# Patient Record
Sex: MALE | Race: ASIAN | Employment: FULL TIME | URBAN - METROPOLITAN AREA
[De-identification: names, ages, dates, MRNs, and addresses within clinical notes are randomized per-mention and may not be internally consistent; named-entity substitution may affect disease eponyms.]

---

## 2021-04-01 ENCOUNTER — HOSPITAL ENCOUNTER (INPATIENT)
Facility: HOSPITAL | Age: 61
LOS: 1 days | Discharge: HOME | DRG: 638 | End: 2021-04-02
Attending: HOSPITALIST | Admitting: HOSPITALIST
Payer: COMMERCIAL

## 2021-04-01 DIAGNOSIS — M79.605 LEFT LEG PAIN: ICD-10-CM

## 2021-04-01 DIAGNOSIS — R00.0 TACHYCARDIA: ICD-10-CM

## 2021-04-01 DIAGNOSIS — Z86.39 HISTORY OF DIABETES MELLITUS: ICD-10-CM

## 2021-04-01 DIAGNOSIS — R50.9 ACUTE FEBRILE ILLNESS: ICD-10-CM

## 2021-04-01 DIAGNOSIS — L03.116 CELLULITIS OF LEFT LOWER EXTREMITY: ICD-10-CM

## 2021-04-01 PROBLEM — L03.90 CELLULITIS: Status: ACTIVE | Noted: 2021-04-01

## 2021-04-01 PROCEDURE — 82962 GLUCOSE BLOOD TEST: CPT

## 2021-04-01 RX ORDER — HEPARIN SODIUM 5000 [USP'U]/ML
5000 INJECTION, SOLUTION INTRAVENOUS; SUBCUTANEOUS EVERY 8 HOURS SCHEDULED
Status: DISCONTINUED | OUTPATIENT
Start: 2021-04-01 | End: 2021-04-02

## 2021-04-01 RX ORDER — LOSARTAN POTASSIUM 100 MG/1
100 TABLET ORAL DAILY
Status: DISCONTINUED | OUTPATIENT
Start: 2021-04-02 | End: 2021-04-02

## 2021-04-01 RX ORDER — ACETAMINOPHEN 325 MG/1
650 TABLET ORAL EVERY 4 HOURS PRN
Status: DISCONTINUED | OUTPATIENT
Start: 2021-04-01 | End: 2021-04-02

## 2021-04-01 RX ORDER — ATORVASTATIN CALCIUM 10 MG/1
10 TABLET, FILM COATED ORAL NIGHTLY
Status: DISCONTINUED | OUTPATIENT
Start: 2021-04-02 | End: 2021-04-02

## 2021-04-01 RX ORDER — DEXTROSE MONOHYDRATE 25 G/50ML
50 INJECTION, SOLUTION INTRAVENOUS
Status: DISCONTINUED | OUTPATIENT
Start: 2021-04-01 | End: 2021-04-02

## 2021-04-01 RX ORDER — HYDROCODONE BITARTRATE AND ACETAMINOPHEN 5; 325 MG/1; MG/1
1 TABLET ORAL EVERY 4 HOURS PRN
Status: DISCONTINUED | OUTPATIENT
Start: 2021-04-01 | End: 2021-04-02

## 2021-04-01 RX ORDER — SODIUM CHLORIDE 9 MG/ML
INJECTION, SOLUTION INTRAVENOUS CONTINUOUS
Status: DISCONTINUED | OUTPATIENT
Start: 2021-04-01 | End: 2021-04-02

## 2021-04-01 RX ORDER — HYDROCODONE BITARTRATE AND ACETAMINOPHEN 5; 325 MG/1; MG/1
2 TABLET ORAL EVERY 4 HOURS PRN
Status: DISCONTINUED | OUTPATIENT
Start: 2021-04-01 | End: 2021-04-02

## 2021-04-01 RX ORDER — ACETAMINOPHEN 325 MG/1
650 TABLET ORAL ONCE
Status: COMPLETED | OUTPATIENT
Start: 2021-04-01 | End: 2021-04-01

## 2021-04-02 VITALS
HEART RATE: 89 BPM | TEMPERATURE: 98 F | OXYGEN SATURATION: 96 % | RESPIRATION RATE: 18 BRPM | DIASTOLIC BLOOD PRESSURE: 80 MMHG | HEIGHT: 70 IN | WEIGHT: 214 LBS | SYSTOLIC BLOOD PRESSURE: 126 MMHG | BODY MASS INDEX: 30.64 KG/M2

## 2021-04-02 PROCEDURE — 85025 COMPLETE CBC W/AUTO DIFF WBC: CPT | Performed by: HOSPITALIST

## 2021-04-02 PROCEDURE — 80048 BASIC METABOLIC PNL TOTAL CA: CPT | Performed by: HOSPITALIST

## 2021-04-02 PROCEDURE — 82962 GLUCOSE BLOOD TEST: CPT

## 2021-04-02 RX ORDER — CEPHALEXIN 500 MG/1
500 CAPSULE ORAL 3 TIMES DAILY
Qty: 45 CAPSULE | Refills: 0 | Status: SHIPPED | OUTPATIENT
Start: 2021-04-02 | End: 2021-04-17

## 2021-04-02 RX ORDER — CEFAZOLIN SODIUM/WATER 2 G/20 ML
2 SYRINGE (ML) INTRAVENOUS EVERY 8 HOURS
Status: DISCONTINUED | OUTPATIENT
Start: 2021-04-02 | End: 2021-04-02

## 2021-04-02 NOTE — PROGRESS NOTES
Pt cleared by all MD's for discharge. Discharge education completed at bedside with spouse, all questions answered. PIV removed, belongings packed. Left foot re-wrapped with Kerlix roll. Scripts for Keflex sent to patient's pharmacy.  Pt discharging home vi

## 2021-04-02 NOTE — PLAN OF CARE
Pt a/ox4. RA//IS. SCDs/ankle pumps encouraged. IV zosyn infusing per order. Voiding freely. Last bowel movement 4/1. VSS Afebrile. Pt c/o of mild pain PO tylenol administered. SB assistance. L foot wrapped in gauze dsg cdi.  POC updated with pt and mac

## 2021-04-02 NOTE — CONSULTS
INFECTIOUS DISEASE CONSULTATION    Benjamin Jamison Patient Status:  Inpatient    1960 MRN [id number]   [facility] 3SW-A Attending Dior Garland, *   Hosp Day # 1 PCP [facility] encounter. metFORMIN HCl 850 MG Oral Tab, Take 850 mg by mouth 3 (three) times daily.   , Disp: , Rfl:   Irbesartan 150 MG Oral Tab, Take 300 mg by mouth nightly., Disp: , Rfl:   acarbose 100 MG Oral Tab, Take 100 mg by mouth 3 (three) times daily with lida [facility]  [phone]

## 2021-04-02 NOTE — H&P
HERIBERTO Hospitalist H&P       CC: No chief complaint on file.        PCP: HERIBERTO NON PHYSICIAN PROVIDER    History of Present Illness:  Patient is a 62year old male with PMH sig for DMII, HTN, HL who lives in [place], presented to [place] [id number] in [facility] on 3/31 fo Subcutaneous Q8H [address]     Continuous Infusing Medication:  • sodium chloride 75 mL/hr at 04/01/21 2203     PRN Medication:glucose **OR** Glucose-Vitamin C **OR** dextrose **OR** glucose **OR** Glucose-Vitamin C, acetaminophen **OR** HYDROcodone-acetaminophe Cellulitis of left lower extremity COMPARISON: None. FINDINGS: There has been amputation of the fifth ray, most of the fourth ray, as well as the distal aspect of the third metatarsal bone and second digit.  The residual osseous structures appear intact wit pain  - 2/2 above  - tylenol PRN given  - pt has norco PRN also available, hasn't yet needed    # DMII  - hold PO regimen  - SSI with QID accuchecks    # HTN  - SBP ~110  - hold home ARB for now    Prophy: hep sq    Dispo: admit.  ?dc home later today vs to

## 2021-04-02 NOTE — PLAN OF CARE
A&O x4. VSS on RA. SCD's on bilaterally. IS encouraged. Voiding freely. LBM 4/1/2021. Ambulating SBA. Left foot CDI, wrapped in Kerlix roll per patient request. No drainage/bleeding noted. Zosyn infsuing Q8. ID to see patient today.  Pt and spouse updated o

## 2021-04-05 NOTE — PAYOR COMM NOTE
--------------  ADMISSION REVIEW     Payor: [address] [employer]  Subscriber #:  [id number]  Authorization Number: [id number]    Admit date: 4/1/21  Admit time:  6:34 PM   DISCHARGED 4/2      Admitting Physician: Jose Booker MD  Attending Physi MEDS  metFORMIN HCl 850 MG Oral Tab, Take 850 mg by mouth 3 (three) times daily. , Disp: , Rfl:   Irbesartan 150 MG Oral Tab, Take 300 mg by mouth nightly., Disp: , Rfl:   acarbose 100 MG Oral Tab, Take 100 mg by mouth 3 (three) times daily with meals. , D * 137 137   K 4.18 4.12 4.0   CL 97* 104 106   CO2 20.8* 20.3* 26.0   BUN 13.0 11.0 12   CREATSERUM 0.90 0.87 0.87   CA 9.6 9.3 9.0   * 147* 126*     Recent Labs   Lab 03/31/21  1809 04/01/21  1433   ALT 19 16   AST 16 15   ALB 4.3 4.0     R Patient is a 62year old male with PMH sig for DMII, HTN, HL who lives in [place], admitted for L foot cellulitis.      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

## 2021-04-08 NOTE — PAYOR COMM NOTE
--------------  DISCHARGE REVIEW    Payor: [address] [place] [address]  Subscriber #:  [id number]  Authorization Number: [id number]    Admit date: 4/1/21  Admit time:   6:34 PM  Discharge Date: 4/2/2021      WE ARE STILL AWAITING YOUR DETERMINATION ON THIS INPT A